# Patient Record
Sex: FEMALE | Race: WHITE | Employment: PART TIME | URBAN - METROPOLITAN AREA
[De-identification: names, ages, dates, MRNs, and addresses within clinical notes are randomized per-mention and may not be internally consistent; named-entity substitution may affect disease eponyms.]

---

## 2017-04-24 ENCOUNTER — TELEPHONE (OUTPATIENT)
Dept: FAMILY MEDICINE CLINIC | Facility: CLINIC | Age: 37
End: 2017-04-24

## 2017-04-25 ENCOUNTER — TELEPHONE (OUTPATIENT)
Dept: FAMILY MEDICINE CLINIC | Facility: CLINIC | Age: 37
End: 2017-04-25

## 2017-04-25 ENCOUNTER — OFFICE VISIT (OUTPATIENT)
Dept: FAMILY MEDICINE CLINIC | Facility: CLINIC | Age: 37
End: 2017-04-25

## 2017-04-25 ENCOUNTER — APPOINTMENT (OUTPATIENT)
Dept: LAB | Age: 37
End: 2017-04-25
Attending: FAMILY MEDICINE
Payer: COMMERCIAL

## 2017-04-25 VITALS
DIASTOLIC BLOOD PRESSURE: 74 MMHG | TEMPERATURE: 98 F | BODY MASS INDEX: 26 KG/M2 | HEART RATE: 64 BPM | RESPIRATION RATE: 12 BRPM | WEIGHT: 146 LBS | SYSTOLIC BLOOD PRESSURE: 110 MMHG

## 2017-04-25 DIAGNOSIS — M25.561 ACUTE PAIN OF RIGHT KNEE: Primary | ICD-10-CM

## 2017-04-25 DIAGNOSIS — R63.5 WEIGHT GAIN: ICD-10-CM

## 2017-04-25 DIAGNOSIS — K64.4 EXTERNAL HEMORRHOID: ICD-10-CM

## 2017-04-25 DIAGNOSIS — Z91.09 ENVIRONMENTAL ALLERGIES: ICD-10-CM

## 2017-04-25 PROCEDURE — 36415 COLL VENOUS BLD VENIPUNCTURE: CPT | Performed by: FAMILY MEDICINE

## 2017-04-25 PROCEDURE — 99214 OFFICE O/P EST MOD 30 MIN: CPT | Performed by: FAMILY MEDICINE

## 2017-04-25 PROCEDURE — 84439 ASSAY OF FREE THYROXINE: CPT | Performed by: FAMILY MEDICINE

## 2017-04-25 PROCEDURE — 84443 ASSAY THYROID STIM HORMONE: CPT | Performed by: FAMILY MEDICINE

## 2017-04-25 RX ORDER — FLUTICASONE PROPIONATE 50 MCG
2 SPRAY, SUSPENSION (ML) NASAL DAILY
Qty: 3 BOTTLE | Refills: 3 | Status: SHIPPED | OUTPATIENT
Start: 2017-04-25 | End: 2018-04-20

## 2017-04-25 NOTE — PROGRESS NOTES
Maggie Radford is a 39year old female. CHIEF COMPLAINT   Allergies, knee pain, hemorrhoid  HPI:   Allergies worse recently. Has just started fluticasone. Usually this alone will help her symptoms. Right knee pain for a couple months.   Noticed about 2 da denies chest pain on exertion  GI: denies abdominal pain and denies heartburn  NEURO: denies headaches    EXAM:   /74 mmHg  Pulse 64  Temp(Src) 98.3 °F (36.8 °C)  Resp 12  Wt 146 lb  GENERAL: well developed, well nourished,in no apparent distress  SK

## 2017-04-25 NOTE — TELEPHONE ENCOUNTER
Incoming call from pharmacist.  Prescribed medication Anucort-HC is a non covered medication.   Preferred alternative is: Proctozone HC cream, please advise, Thank you

## 2017-10-02 ENCOUNTER — OFFICE VISIT (OUTPATIENT)
Dept: FAMILY MEDICINE CLINIC | Facility: CLINIC | Age: 37
End: 2017-10-02

## 2017-10-02 ENCOUNTER — APPOINTMENT (OUTPATIENT)
Dept: LAB | Age: 37
End: 2017-10-02
Attending: FAMILY MEDICINE
Payer: COMMERCIAL

## 2017-10-02 VITALS
HEART RATE: 60 BPM | HEIGHT: 63.25 IN | DIASTOLIC BLOOD PRESSURE: 74 MMHG | SYSTOLIC BLOOD PRESSURE: 116 MMHG | BODY MASS INDEX: 25.37 KG/M2 | RESPIRATION RATE: 12 BRPM | WEIGHT: 145 LBS | TEMPERATURE: 99 F

## 2017-10-02 DIAGNOSIS — Z13.89 SCREENING FOR GENITOURINARY CONDITION: ICD-10-CM

## 2017-10-02 DIAGNOSIS — Z80.0 FAMILY HISTORY OF LIVER CANCER: ICD-10-CM

## 2017-10-02 DIAGNOSIS — Z00.00 BLOOD TESTS FOR ROUTINE GENERAL PHYSICAL EXAMINATION: ICD-10-CM

## 2017-10-02 DIAGNOSIS — Z12.4 PAP SMEAR FOR CERVICAL CANCER SCREENING: ICD-10-CM

## 2017-10-02 DIAGNOSIS — Z00.00 ROUTINE GENERAL MEDICAL EXAMINATION AT A HEALTH CARE FACILITY: Primary | ICD-10-CM

## 2017-10-02 DIAGNOSIS — Z11.51 SCREENING FOR HPV (HUMAN PAPILLOMAVIRUS): ICD-10-CM

## 2017-10-02 PROCEDURE — 88175 CYTOPATH C/V AUTO FLUID REDO: CPT | Performed by: FAMILY MEDICINE

## 2017-10-02 PROCEDURE — 36415 COLL VENOUS BLD VENIPUNCTURE: CPT | Performed by: FAMILY MEDICINE

## 2017-10-02 PROCEDURE — 87624 HPV HI-RISK TYP POOLED RSLT: CPT | Performed by: FAMILY MEDICINE

## 2017-10-02 PROCEDURE — 80061 LIPID PANEL: CPT | Performed by: FAMILY MEDICINE

## 2017-10-02 PROCEDURE — 80050 GENERAL HEALTH PANEL: CPT | Performed by: FAMILY MEDICINE

## 2017-10-02 PROCEDURE — 99395 PREV VISIT EST AGE 18-39: CPT | Performed by: FAMILY MEDICINE

## 2017-10-02 PROCEDURE — 82306 VITAMIN D 25 HYDROXY: CPT | Performed by: FAMILY MEDICINE

## 2017-10-02 PROCEDURE — 81003 URINALYSIS AUTO W/O SCOPE: CPT | Performed by: FAMILY MEDICINE

## 2017-10-02 RX ORDER — HYDROCHLOROTHIAZIDE 12.5 MG/1
12.5 CAPSULE, GELATIN COATED ORAL DAILY
Qty: 30 CAPSULE | Refills: 1 | Status: SHIPPED | OUTPATIENT
Start: 2017-10-02 | End: 2018-04-19

## 2017-10-02 NOTE — PROGRESS NOTES
CHIEF COMPLAINT   Well woman exam  HPI:   Melissa Chapin is a 44year old female who presents for a complete physical exam.    Normal pap hx.   Swelling in legs from varicose veins - treated in about 2015 - still gets some swelling - doesn't really want to we exertion  CARDIOVASCULAR: denies chest pain on exertion  GI: denies abdominal pain,denies heartburn  : denies dysuria, vaginal discharge or itching,periods regular   MUSCULOSKELETAL: denies back pain  NEURO: denies headaches  PSYCHE: denies depression or

## 2017-10-10 ENCOUNTER — LAB ENCOUNTER (OUTPATIENT)
Dept: LAB | Age: 37
End: 2017-10-10
Attending: FAMILY MEDICINE
Payer: COMMERCIAL

## 2017-10-10 DIAGNOSIS — R79.89 ABNORMAL CBC: Primary | ICD-10-CM

## 2017-10-10 DIAGNOSIS — R79.89 ABNORMAL CBC: ICD-10-CM

## 2017-10-10 PROCEDURE — 85025 COMPLETE CBC W/AUTO DIFF WBC: CPT | Performed by: FAMILY MEDICINE

## 2017-10-10 PROCEDURE — 36415 COLL VENOUS BLD VENIPUNCTURE: CPT | Performed by: FAMILY MEDICINE

## 2017-10-13 DIAGNOSIS — R79.9 ABNORMAL BLOOD FINDINGS: Primary | ICD-10-CM

## 2017-10-31 ENCOUNTER — LAB ENCOUNTER (OUTPATIENT)
Dept: LAB | Age: 37
End: 2017-10-31
Attending: FAMILY MEDICINE
Payer: COMMERCIAL

## 2017-10-31 DIAGNOSIS — R79.9 ABNORMAL BLOOD FINDINGS: ICD-10-CM

## 2017-10-31 PROCEDURE — 36415 COLL VENOUS BLD VENIPUNCTURE: CPT | Performed by: FAMILY MEDICINE

## 2017-10-31 PROCEDURE — 82728 ASSAY OF FERRITIN: CPT | Performed by: FAMILY MEDICINE

## 2017-10-31 PROCEDURE — 85025 COMPLETE CBC W/AUTO DIFF WBC: CPT | Performed by: FAMILY MEDICINE

## 2017-10-31 PROCEDURE — 83540 ASSAY OF IRON: CPT | Performed by: FAMILY MEDICINE

## 2017-11-02 ENCOUNTER — TELEPHONE (OUTPATIENT)
Dept: FAMILY MEDICINE CLINIC | Facility: CLINIC | Age: 37
End: 2017-11-02

## 2017-11-02 DIAGNOSIS — R79.89 ABNORMAL CBC: Primary | ICD-10-CM

## 2018-04-17 ENCOUNTER — TELEPHONE (OUTPATIENT)
Dept: FAMILY MEDICINE CLINIC | Facility: CLINIC | Age: 38
End: 2018-04-17

## 2018-04-17 NOTE — TELEPHONE ENCOUNTER
Pt feels OK today  Latest /80     Had episode of vision changes yesterday with BP reading of  125/85  Pt is not currently taking her BP meds    Appt made 4/19th   Advised to record BP readings  Advised to go to ED if with worsening s/s   With verbali

## 2018-04-19 ENCOUNTER — LAB ENCOUNTER (OUTPATIENT)
Dept: LAB | Age: 38
End: 2018-04-19
Attending: FAMILY MEDICINE
Payer: COMMERCIAL

## 2018-04-19 DIAGNOSIS — R79.89 ABNORMAL CBC: ICD-10-CM

## 2018-04-19 PROBLEM — R60.0 LOCALIZED EDEMA: Status: ACTIVE | Noted: 2018-04-19

## 2018-04-19 PROCEDURE — 85025 COMPLETE CBC W/AUTO DIFF WBC: CPT | Performed by: FAMILY MEDICINE

## 2018-04-19 PROCEDURE — 82728 ASSAY OF FERRITIN: CPT | Performed by: FAMILY MEDICINE

## 2018-04-19 PROCEDURE — 36415 COLL VENOUS BLD VENIPUNCTURE: CPT | Performed by: FAMILY MEDICINE

## 2018-04-19 NOTE — PROGRESS NOTES
CHIEF COMPLAINT:   Salvador Marte a 44year old female is here for BP and anxiety  HPI:    More anxiety recently. Seeing a counselor for anxiety in [place]. Has been seeing her since July when dad was in hospice.  Was on medication for anxiety in about

## 2018-04-19 NOTE — PATIENT INSTRUCTIONS
Check BP different times of the day about 4-5 times per week - write down numbers - bring to next appointment. Bring BP monitor to next appointment.

## 2018-06-14 RX ORDER — FLUOXETINE 10 MG/1
CAPSULE ORAL
Qty: 30 CAPSULE | Refills: 0 | Status: SHIPPED | OUTPATIENT
Start: 2018-06-14 | End: 2018-07-17

## 2018-06-27 ENCOUNTER — OFFICE VISIT (OUTPATIENT)
Dept: FAMILY MEDICINE CLINIC | Facility: CLINIC | Age: 38
End: 2018-06-27

## 2018-06-27 VITALS
HEART RATE: 74 BPM | HEIGHT: 63.25 IN | WEIGHT: 143 LBS | RESPIRATION RATE: 14 BRPM | TEMPERATURE: 98 F | DIASTOLIC BLOOD PRESSURE: 80 MMHG | BODY MASS INDEX: 25.02 KG/M2 | SYSTOLIC BLOOD PRESSURE: 120 MMHG

## 2018-06-27 DIAGNOSIS — R10.11 RUQ PAIN: Primary | ICD-10-CM

## 2018-06-27 PROCEDURE — 99213 OFFICE O/P EST LOW 20 MIN: CPT | Performed by: FAMILY MEDICINE

## 2018-06-27 PROCEDURE — 81003 URINALYSIS AUTO W/O SCOPE: CPT | Performed by: FAMILY MEDICINE

## 2018-06-28 NOTE — PROGRESS NOTES
[facility] Family Medicine Office Note  Chief Complaint:   Patient presents with:  Pain: right rib pain x 4 days      HPI:   This is a 43year old female coming in for abdominal pain. Pain is located at RUQ.  Pain is described as dull, aching, sh pain, chest pressure or chest discomfort. No palpitations or edema.   Denies any dyspnea on exertion or at rest  RESPIRATORY:  Denies shortness of breath, cough  GASTROINTESTINAL:  + abdominal pain, denies nausea, vomiting, constipation, diarrhea, or blood learning. Medical education done. Outcome: Patient verbalizes understanding. Patient is notified to call with any questions, complications, allergies, or worsening or changing symptoms.   Patient is to call with any side effects or complications from the

## 2018-07-02 ENCOUNTER — HOSPITAL ENCOUNTER (OUTPATIENT)
Dept: ULTRASOUND IMAGING | Age: 38
Discharge: HOME | End: 2018-07-02
Attending: FAMILY MEDICINE
Payer: COMMERCIAL

## 2018-07-02 DIAGNOSIS — R10.11 RUQ PAIN: ICD-10-CM

## 2018-07-02 PROCEDURE — 76700 US EXAM ABDOM COMPLETE: CPT | Performed by: FAMILY MEDICINE

## 2018-07-19 NOTE — TELEPHONE ENCOUNTER
Please call pt for follow up Anxiety with Carolina Fisher. She was supposed to follow up 6-8 weeks after starting the medication. Thanks.

## 2018-07-20 RX ORDER — FLUOXETINE 10 MG/1
CAPSULE ORAL
Qty: 30 CAPSULE | Refills: 0 | Status: SHIPPED | OUTPATIENT
Start: 2018-07-20 | End: 2018-09-18

## 2018-07-31 ENCOUNTER — LAB ENCOUNTER (OUTPATIENT)
Dept: LAB | Age: 38
End: 2018-07-31
Attending: FAMILY MEDICINE
Payer: COMMERCIAL

## 2018-07-31 ENCOUNTER — OFFICE VISIT (OUTPATIENT)
Dept: FAMILY MEDICINE CLINIC | Facility: CLINIC | Age: 38
End: 2018-07-31
Payer: COMMERCIAL

## 2018-07-31 VITALS
DIASTOLIC BLOOD PRESSURE: 70 MMHG | RESPIRATION RATE: 12 BRPM | HEIGHT: 63.25 IN | BODY MASS INDEX: 25.2 KG/M2 | WEIGHT: 144 LBS | SYSTOLIC BLOOD PRESSURE: 118 MMHG | HEART RATE: 68 BPM

## 2018-07-31 DIAGNOSIS — Z13.0 SCREENING FOR ENDOCRINE, METABOLIC AND IMMUNITY DISORDER: ICD-10-CM

## 2018-07-31 DIAGNOSIS — Z79.899 MEDICATION MANAGEMENT: ICD-10-CM

## 2018-07-31 DIAGNOSIS — Z13.29 SCREENING FOR ENDOCRINE, METABOLIC AND IMMUNITY DISORDER: ICD-10-CM

## 2018-07-31 DIAGNOSIS — R10.11 RUQ ABDOMINAL PAIN: ICD-10-CM

## 2018-07-31 DIAGNOSIS — Z00.00 LABORATORY EXAMINATION ORDERED AS PART OF A ROUTINE GENERAL MEDICAL EXAMINATION: Primary | ICD-10-CM

## 2018-07-31 DIAGNOSIS — R60.0 LEG EDEMA: ICD-10-CM

## 2018-07-31 DIAGNOSIS — R93.89 ABNORMAL FINDINGS ON IMAGING TEST: ICD-10-CM

## 2018-07-31 DIAGNOSIS — Z13.228 SCREENING FOR ENDOCRINE, METABOLIC AND IMMUNITY DISORDER: ICD-10-CM

## 2018-07-31 DIAGNOSIS — F41.9 ANXIETY: ICD-10-CM

## 2018-07-31 DIAGNOSIS — N20.0 RIGHT KIDNEY STONE: ICD-10-CM

## 2018-07-31 PROBLEM — K76.0 FATTY LIVER: Status: ACTIVE | Noted: 2018-07-31

## 2018-07-31 LAB
ALBUMIN SERPL-MCNC: 3.7 G/DL (ref 3.5–4.8)
ALBUMIN/GLOB SERPL: 1 {RATIO} (ref 1–2)
ALP LIVER SERPL-CCNC: 63 U/L (ref 37–98)
ALT SERPL-CCNC: 19 U/L (ref 14–54)
ANION GAP SERPL CALC-SCNC: 6 MMOL/L (ref 0–18)
AST SERPL-CCNC: 24 U/L (ref 15–41)
BILIRUB SERPL-MCNC: 0.3 MG/DL (ref 0.1–2)
BUN BLD-MCNC: 16 MG/DL (ref 8–20)
BUN/CREAT SERPL: 20 (ref 10–20)
CALCIUM BLD-MCNC: 8.8 MG/DL (ref 8.3–10.3)
CHLORIDE SERPL-SCNC: 106 MMOL/L (ref 101–111)
CO2 SERPL-SCNC: 26 MMOL/L (ref 22–32)
CREAT BLD-MCNC: 0.8 MG/DL (ref 0.55–1.02)
GLOBULIN PLAS-MCNC: 3.7 G/DL (ref 2.5–3.7)
GLUCOSE BLD-MCNC: 84 MG/DL (ref 70–99)
HBV SURFACE AB SER QL: NONREACTIVE
HBV SURFACE AB SERPL IA-ACNC: <3.1 MIU/ML
HEPATITIS A VIRUS AB, TOTAL: NONREACTIVE
M PROTEIN MFR SERPL ELPH: 7.4 G/DL (ref 6.1–8.3)
OSMOLALITY SERPL CALC.SUM OF ELEC: 286 MOSM/KG (ref 275–295)
POTASSIUM SERPL-SCNC: 4.2 MMOL/L (ref 3.6–5.1)
RUBV IGG SER QL: POSITIVE
RUBV IGG SER-ACNC: 317.6 IU/ML (ref 10–?)
SODIUM SERPL-SCNC: 138 MMOL/L (ref 136–144)

## 2018-07-31 PROCEDURE — 86787 VARICELLA-ZOSTER ANTIBODY: CPT | Performed by: FAMILY MEDICINE

## 2018-07-31 PROCEDURE — 86706 HEP B SURFACE ANTIBODY: CPT | Performed by: FAMILY MEDICINE

## 2018-07-31 PROCEDURE — 80053 COMPREHEN METABOLIC PANEL: CPT | Performed by: FAMILY MEDICINE

## 2018-07-31 PROCEDURE — 86762 RUBELLA ANTIBODY: CPT | Performed by: FAMILY MEDICINE

## 2018-07-31 PROCEDURE — 99214 OFFICE O/P EST MOD 30 MIN: CPT | Performed by: FAMILY MEDICINE

## 2018-07-31 PROCEDURE — 36415 COLL VENOUS BLD VENIPUNCTURE: CPT | Performed by: FAMILY MEDICINE

## 2018-07-31 PROCEDURE — 86708 HEPATITIS A ANTIBODY: CPT | Performed by: FAMILY MEDICINE

## 2018-07-31 PROCEDURE — 86765 RUBEOLA ANTIBODY: CPT | Performed by: FAMILY MEDICINE

## 2018-07-31 PROCEDURE — 86735 MUMPS ANTIBODY: CPT | Performed by: FAMILY MEDICINE

## 2018-07-31 RX ORDER — FLUOXETINE 20 MG/1
20 TABLET, FILM COATED ORAL DAILY
Qty: 90 TABLET | Refills: 0 | Status: SHIPPED | OUTPATIENT
Start: 2018-07-31 | End: 2018-09-18

## 2018-08-01 PROBLEM — Z86.59 HISTORY OF ANOREXIA NERVOSA: Status: ACTIVE | Noted: 2018-08-01

## 2018-08-01 PROBLEM — Z86.59: Status: ACTIVE | Noted: 2018-08-01

## 2018-08-01 NOTE — PROGRESS NOTES
Abril Ayers is a 43year old female. HPI:   PT. Is here for a med check. Patient states she was started on fluoxetine in April 2018 by Marcus Weiss due to anxiety.     When reviewing the medical record, it was noted that she has a history of anorex facility-administered medications for this visit.     No Known Allergies   Past Medical History:   Diagnosis Date   • Anorexia nervosa    • Depressive disorder, not elsewhere classified    • Personal history of neurosis    • Unspecified hemorrhoids without murmur  GI: soft, good BS's; nontender  EXTREMITIES: no cyanosis, clubbing; tr. left edema    ASSESSMENT AND PLAN:   Laboratory examination ordered as part of a routine general medical examination  (primary encounter diagnosis)  Medication management  Scre

## 2018-08-03 LAB
MEV IGG SER IA-ACNC: POSITIVE
MUV IGG SER IA-ACNC: POSITIVE
VZV IGG SER IA-ACNC: POSITIVE

## 2018-08-24 RX ORDER — FLUOXETINE 10 MG/1
CAPSULE ORAL
Qty: 30 CAPSULE | Refills: 0 | OUTPATIENT
Start: 2018-08-24

## 2018-08-24 NOTE — TELEPHONE ENCOUNTER
Not protocol medication. LOV :7/31/18 medication follow up anxiety discussed  Last labs done :7/31/18  Next appointment :9/20/18  Please see pending medication. Refill if appropriate.    Last refill: according to last office note patient is taking fluoxet

## 2018-08-24 NOTE — TELEPHONE ENCOUNTER
Please call pt and remind her she is to be seen around 09/11/2018 for follow up prior to refilling her Fluoxetine. Thanks. Not if with Dr. Dianne Soares or Shirley Mars.   Usually sees Shirley Mars, but saw Dr. Dianne Soares for physical.

## 2018-09-18 ENCOUNTER — TELEPHONE (OUTPATIENT)
Dept: FAMILY MEDICINE CLINIC | Facility: CLINIC | Age: 38
End: 2018-09-18

## 2018-09-18 NOTE — PROGRESS NOTES
Riley Dunbar is a 43year old female. CHIEF COMPLAINT   Follow up on anxiety  HPI:   Here to follow-up on Prozac. Was started on Prozac in April for increasing anxiety. She had previously been on Prozac back in 2004. No counseling currently.  No depressi 24.60 kg/m²   GENERAL: well developed, well nourished,in no apparent distress  SKIN: no rashes,no suspicious lesions  NECK: supple,no adenopathy  LUNGS: clear to auscultation  CARDIO: RRR without murmur  EXTREMITIES: no cyanosis, clubbing or edema.   Right

## 2018-09-19 NOTE — TELEPHONE ENCOUNTER
I saw patient today for medication follow up. She has an appointment scheduled on Thursday. I would assume we can cancel this since it appears that she rescheduled this appointment for today.   Please verify with  who made the appointment today a

## 2019-04-05 ENCOUNTER — WALK IN (OUTPATIENT)
Dept: URGENT CARE | Age: 39
End: 2019-04-05

## 2019-04-05 VITALS
HEIGHT: 63 IN | BODY MASS INDEX: 26.58 KG/M2 | TEMPERATURE: 98.5 F | RESPIRATION RATE: 16 BRPM | SYSTOLIC BLOOD PRESSURE: 118 MMHG | DIASTOLIC BLOOD PRESSURE: 76 MMHG | WEIGHT: 150 LBS | HEART RATE: 84 BPM

## 2019-04-05 DIAGNOSIS — H61.22 IMPACTED CERUMEN OF LEFT EAR: Primary | ICD-10-CM

## 2019-04-05 PROCEDURE — X1094 NO CHARGE VISIT: HCPCS | Performed by: OBSTETRICS & GYNECOLOGY

## 2019-04-05 PROCEDURE — 69209 REMOVE IMPACTED EAR WAX UNI: CPT | Performed by: OBSTETRICS & GYNECOLOGY

## 2019-04-05 PROCEDURE — 99203 OFFICE O/P NEW LOW 30 MIN: CPT | Performed by: OBSTETRICS & GYNECOLOGY

## 2019-04-05 RX ORDER — NORGESTIMATE AND ETHINYL ESTRADIOL 7DAYSX3 LO
1 KIT ORAL DAILY
COMMUNITY

## 2019-04-05 ASSESSMENT — ENCOUNTER SYMPTOMS
DIAPHORESIS: 0
VOMITING: 0
DIARRHEA: 0
COUGH: 0
FEVER: 0
SINUS PRESSURE: 1
NAUSEA: 0
HEADACHES: 1
SINUS PAIN: 0
CHILLS: 0

## 2020-01-28 ENCOUNTER — OFFICE VISIT (OUTPATIENT)
Dept: FAMILY MEDICINE CLINIC | Facility: CLINIC | Age: 40
End: 2020-01-28
Payer: COMMERCIAL

## 2020-01-28 ENCOUNTER — TELEPHONE (OUTPATIENT)
Dept: FAMILY MEDICINE CLINIC | Facility: CLINIC | Age: 40
End: 2020-01-28

## 2020-01-28 VITALS
BODY MASS INDEX: 28 KG/M2 | DIASTOLIC BLOOD PRESSURE: 80 MMHG | WEIGHT: 158 LBS | TEMPERATURE: 99 F | HEART RATE: 77 BPM | HEIGHT: 63 IN | OXYGEN SATURATION: 99 % | SYSTOLIC BLOOD PRESSURE: 120 MMHG

## 2020-01-28 DIAGNOSIS — R10.11 RUQ PAIN: Primary | ICD-10-CM

## 2020-01-28 DIAGNOSIS — K76.0 FATTY LIVER: ICD-10-CM

## 2020-01-28 DIAGNOSIS — F41.9 ANXIETY: ICD-10-CM

## 2020-01-28 DIAGNOSIS — Z00.00 BLOOD TESTS FOR ROUTINE GENERAL PHYSICAL EXAMINATION: ICD-10-CM

## 2020-01-28 DIAGNOSIS — Z80.0 FAMILY HISTORY OF LIVER CANCER: ICD-10-CM

## 2020-01-28 DIAGNOSIS — D50.9 IRON DEFICIENCY ANEMIA, UNSPECIFIED IRON DEFICIENCY ANEMIA TYPE: ICD-10-CM

## 2020-01-28 PROCEDURE — 99214 OFFICE O/P EST MOD 30 MIN: CPT | Performed by: FAMILY MEDICINE

## 2020-01-28 RX ORDER — ESCITALOPRAM OXALATE 5 MG/1
5 TABLET ORAL DAILY
Qty: 30 TABLET | Refills: 1 | Status: SHIPPED | OUTPATIENT
Start: 2020-01-28 | End: 2020-08-25

## 2020-01-28 NOTE — TELEPHONE ENCOUNTER
1. What are your symptoms? Pain on right side of abdomen        2. How long have you been having these symptoms? approx a month - now more constant        3. Have you done anything already to treat your symptoms?      Has taken Ibuprofen but has not h

## 2020-01-28 NOTE — TELEPHONE ENCOUNTER
Onset-one month on and off  RUQ radiates to back   \"Burning like pain\" 3/10 right now  Mild to moderate  No N/V   No fever  No urinary sx  No diarrhea/ constipation  Unchanged with activity  Appetite ok     Hx of abdl pain like this last year, went away

## 2020-01-28 NOTE — PROGRESS NOTES
Salvador Marte is a 35year old female. CHIEF COMPLAINT   RUQ pain and follow up on anxiety  HPI:   Stopped prozac in July - took paxil and zoloft previously. Didn't seem to help much. Would like to try a different medication other than prozac.   Was [facility] Anorexia nervosa    • Depressive disorder, not elsewhere classified    • Personal history of neurosis    • Unspecified hemorrhoids without mention of complication       Social History:  Social History    Tobacco Use      Smoking status: Former Smoker plan.  The patient is asked to return in 4-6 weeks - sooner if any worsening.

## 2020-02-11 ENCOUNTER — PATIENT MESSAGE (OUTPATIENT)
Dept: FAMILY MEDICINE CLINIC | Facility: CLINIC | Age: 40
End: 2020-02-11

## 2020-02-11 ENCOUNTER — HOSPITAL ENCOUNTER (OUTPATIENT)
Dept: ULTRASOUND IMAGING | Age: 40
Discharge: HOME | End: 2020-02-11
Attending: FAMILY MEDICINE
Payer: COMMERCIAL

## 2020-02-11 DIAGNOSIS — R10.11 RUQ PAIN: ICD-10-CM

## 2020-02-11 PROCEDURE — 76700 US EXAM ABDOM COMPLETE: CPT | Performed by: FAMILY MEDICINE

## 2020-02-22 ENCOUNTER — LAB ENCOUNTER (OUTPATIENT)
Dept: LAB | Age: 40
End: 2020-02-22
Attending: FAMILY MEDICINE
Payer: COMMERCIAL

## 2020-02-22 DIAGNOSIS — Z00.00 BLOOD TESTS FOR ROUTINE GENERAL PHYSICAL EXAMINATION: ICD-10-CM

## 2020-02-22 DIAGNOSIS — D50.9 IRON DEFICIENCY ANEMIA, UNSPECIFIED IRON DEFICIENCY ANEMIA TYPE: ICD-10-CM

## 2020-02-22 DIAGNOSIS — Z80.0 FAMILY HISTORY OF LIVER CANCER: ICD-10-CM

## 2020-02-22 LAB
ALBUMIN SERPL-MCNC: 3.6 G/DL (ref 3.4–5)
ALBUMIN/GLOB SERPL: 0.9 {RATIO} (ref 1–2)
ALP LIVER SERPL-CCNC: 61 U/L (ref 37–98)
ALT SERPL-CCNC: 28 U/L (ref 13–56)
ANION GAP SERPL CALC-SCNC: 2 MMOL/L (ref 0–18)
AST SERPL-CCNC: 23 U/L (ref 15–37)
BASOPHILS # BLD AUTO: 0.01 X10(3) UL (ref 0–0.2)
BASOPHILS NFR BLD AUTO: 0.3 %
BILIRUB SERPL-MCNC: 0.3 MG/DL (ref 0.1–2)
BUN BLD-MCNC: 10 MG/DL (ref 7–18)
BUN/CREAT SERPL: 14.7 (ref 10–20)
CALCIUM BLD-MCNC: 8.7 MG/DL (ref 8.5–10.1)
CHLORIDE SERPL-SCNC: 107 MMOL/L (ref 98–112)
CHOLEST SMN-MCNC: 196 MG/DL (ref ?–200)
CO2 SERPL-SCNC: 27 MMOL/L (ref 21–32)
CREAT BLD-MCNC: 0.68 MG/DL (ref 0.55–1.02)
DEPRECATED HBV CORE AB SER IA-ACNC: 7.2 NG/ML (ref 12–240)
DEPRECATED RDW RBC AUTO: 47 FL (ref 35.1–46.3)
EOSINOPHIL # BLD AUTO: 0.03 X10(3) UL (ref 0–0.7)
EOSINOPHIL NFR BLD AUTO: 0.9 %
ERYTHROCYTE [DISTWIDTH] IN BLOOD BY AUTOMATED COUNT: 13.9 % (ref 11–15)
GLOBULIN PLAS-MCNC: 3.8 G/DL (ref 2.8–4.4)
GLUCOSE BLD-MCNC: 97 MG/DL (ref 70–99)
HCT VFR BLD AUTO: 34.8 % (ref 35–48)
HDLC SERPL-MCNC: 106 MG/DL (ref 40–59)
HGB BLD-MCNC: 11.2 G/DL (ref 12–16)
IMM GRANULOCYTES # BLD AUTO: 0.01 X10(3) UL (ref 0–1)
IMM GRANULOCYTES NFR BLD: 0.3 %
LDLC SERPL CALC-MCNC: 75 MG/DL (ref ?–100)
LYMPHOCYTES # BLD AUTO: 1.24 X10(3) UL (ref 1–4)
LYMPHOCYTES NFR BLD AUTO: 36.2 %
M PROTEIN MFR SERPL ELPH: 7.4 G/DL (ref 6.4–8.2)
MCH RBC QN AUTO: 29.6 PG (ref 26–34)
MCHC RBC AUTO-ENTMCNC: 32.2 G/DL (ref 31–37)
MCV RBC AUTO: 92.1 FL (ref 80–100)
MONOCYTES # BLD AUTO: 0.35 X10(3) UL (ref 0.1–1)
MONOCYTES NFR BLD AUTO: 10.2 %
NEUTROPHILS # BLD AUTO: 1.79 X10 (3) UL (ref 1.5–7.7)
NEUTROPHILS # BLD AUTO: 1.79 X10(3) UL (ref 1.5–7.7)
NEUTROPHILS NFR BLD AUTO: 52.1 %
NONHDLC SERPL-MCNC: 90 MG/DL (ref ?–130)
OSMOLALITY SERPL CALC.SUM OF ELEC: 281 MOSM/KG (ref 275–295)
PATIENT FASTING Y/N/NP: YES
PATIENT FASTING Y/N/NP: YES
PLATELET # BLD AUTO: 251 10(3)UL (ref 150–450)
POTASSIUM SERPL-SCNC: 4.4 MMOL/L (ref 3.5–5.1)
RBC # BLD AUTO: 3.78 X10(6)UL (ref 3.8–5.3)
SODIUM SERPL-SCNC: 136 MMOL/L (ref 136–145)
TRIGL SERPL-MCNC: 75 MG/DL (ref 30–149)
TSI SER-ACNC: 1.33 MIU/ML (ref 0.36–3.74)
VLDLC SERPL CALC-MCNC: 15 MG/DL (ref 0–30)
WBC # BLD AUTO: 3.4 X10(3) UL (ref 4–11)

## 2020-02-22 PROCEDURE — 36415 COLL VENOUS BLD VENIPUNCTURE: CPT | Performed by: FAMILY MEDICINE

## 2020-02-22 PROCEDURE — 80061 LIPID PANEL: CPT | Performed by: FAMILY MEDICINE

## 2020-02-22 PROCEDURE — 80050 GENERAL HEALTH PANEL: CPT | Performed by: FAMILY MEDICINE

## 2020-02-22 PROCEDURE — 82728 ASSAY OF FERRITIN: CPT | Performed by: FAMILY MEDICINE

## 2020-02-24 ENCOUNTER — OFFICE VISIT (OUTPATIENT)
Dept: SURGERY | Facility: CLINIC | Age: 40
End: 2020-02-24
Payer: COMMERCIAL

## 2020-02-24 ENCOUNTER — PATIENT MESSAGE (OUTPATIENT)
Dept: FAMILY MEDICINE CLINIC | Facility: CLINIC | Age: 40
End: 2020-02-24

## 2020-02-24 ENCOUNTER — OFFICE VISIT (OUTPATIENT)
Dept: FAMILY MEDICINE CLINIC | Facility: CLINIC | Age: 40
End: 2020-02-24
Payer: COMMERCIAL

## 2020-02-24 VITALS
HEIGHT: 63 IN | WEIGHT: 156.19 LBS | HEART RATE: 80 BPM | RESPIRATION RATE: 18 BRPM | SYSTOLIC BLOOD PRESSURE: 137 MMHG | BODY MASS INDEX: 27.68 KG/M2 | OXYGEN SATURATION: 100 % | DIASTOLIC BLOOD PRESSURE: 90 MMHG

## 2020-02-24 VITALS
BODY MASS INDEX: 26.96 KG/M2 | SYSTOLIC BLOOD PRESSURE: 124 MMHG | DIASTOLIC BLOOD PRESSURE: 70 MMHG | RESPIRATION RATE: 12 BRPM | HEIGHT: 63.75 IN | WEIGHT: 156 LBS | HEART RATE: 72 BPM | TEMPERATURE: 100 F

## 2020-02-24 DIAGNOSIS — K76.9 LIVER LESION: Primary | ICD-10-CM

## 2020-02-24 DIAGNOSIS — Z00.00 ROUTINE GENERAL MEDICAL EXAMINATION AT A HEALTH CARE FACILITY: Primary | ICD-10-CM

## 2020-02-24 DIAGNOSIS — Z12.31 ENCOUNTER FOR SCREENING MAMMOGRAM FOR MALIGNANT NEOPLASM OF BREAST: ICD-10-CM

## 2020-02-24 DIAGNOSIS — D64.9 ANEMIA, UNSPECIFIED TYPE: ICD-10-CM

## 2020-02-24 PROCEDURE — 99396 PREV VISIT EST AGE 40-64: CPT | Performed by: FAMILY MEDICINE

## 2020-02-24 RX ORDER — ACETAMINOPHEN 160 MG
2000 TABLET,DISINTEGRATING ORAL DAILY
COMMUNITY

## 2020-02-24 RX ORDER — ESCITALOPRAM OXALATE 5 MG/1
TABLET ORAL
COMMUNITY
Start: 2020-01-28 | End: 2020-02-24 | Stop reason: ALTCHOICE

## 2020-02-24 NOTE — PROGRESS NOTES
CHIEF COMPLAINT       Annual Physical Exam  HPI:   Jessica Mcmanus is a 35year old female who presents for a complete physical exam.   Saw Dr. Jaqueline Cota today - repeat CT 6 months planned. Sees [facility] for paps.     Moderate menstrual flow - was on an OCP for [facility] Family History   Problem Relation Age of Onset   • Thyroid Disorder Mother    • Cancer Father 76        Bladder cancer, liver cancer   • Heart Disorder Father    • Cancer Paternal Grandmother 78        leukemia   • Cancer Maternal Aunt 68        colon ca EXTREMITIES: no cyanosis, clubbing or edema  NEURO: Oriented times three,cranial nerves are intact,motor and sensory are grossly intact    ASSESSMENT AND PLAN:   Thanh Atkinson is a 35year old female who presents for a complete physical exam.   Pt' car mortensen

## 2020-02-24 NOTE — PATIENT INSTRUCTIONS
Take slow FE once daily and recheck CBC and ferritin in 8 weeks. Check with mom to make sure she hasn't had any potentially precancerous polyps.   If she has, then call us for referral to a gastroenterologist.

## 2020-02-24 NOTE — PROGRESS NOTES
[facility]  [address], [address]  [address]  [phone] ([phone]) Social History    Tobacco Use      Smoking status: Former Smoker      Smokeless tobacco: Never Used      Tobacco comment: casual in college    Alcohol use:  Yes      Alcohol/week: 0.8 standard drinks      Types: 1 Standard drinks or equivalent per week (she reports claustrophobia for MRI)    - Future elastography after lesion is addressed given the underlying fatty liver  - HAV/HBV susceptible; discussed benefit of vaccination  - Follow up 6 months       Alvarez Del Valle MD  Director of Hepatology  Medic

## 2021-07-27 ENCOUNTER — OFFICE VISIT (OUTPATIENT)
Dept: FAMILY MEDICINE CLINIC | Facility: CLINIC | Age: 41
End: 2021-07-27
Payer: COMMERCIAL

## 2021-07-27 VITALS — HEART RATE: 72 BPM | DIASTOLIC BLOOD PRESSURE: 70 MMHG | SYSTOLIC BLOOD PRESSURE: 104 MMHG | RESPIRATION RATE: 16 BRPM

## 2021-07-27 DIAGNOSIS — Z13.89 SCREENING FOR GENITOURINARY CONDITION: ICD-10-CM

## 2021-07-27 DIAGNOSIS — Z12.31 ENCOUNTER FOR SCREENING MAMMOGRAM FOR MALIGNANT NEOPLASM OF BREAST: ICD-10-CM

## 2021-07-27 DIAGNOSIS — Z00.00 ROUTINE GENERAL MEDICAL EXAMINATION AT A HEALTH CARE FACILITY: Primary | ICD-10-CM

## 2021-07-27 DIAGNOSIS — Z11.51 SCREENING FOR HPV (HUMAN PAPILLOMAVIRUS): ICD-10-CM

## 2021-07-27 DIAGNOSIS — D64.9 ANEMIA, UNSPECIFIED TYPE: ICD-10-CM

## 2021-07-27 DIAGNOSIS — Z00.00 BLOOD TESTS FOR ROUTINE GENERAL PHYSICAL EXAMINATION: ICD-10-CM

## 2021-07-27 DIAGNOSIS — Z12.4 PAP SMEAR FOR CERVICAL CANCER SCREENING: ICD-10-CM

## 2021-07-27 PROCEDURE — 3074F SYST BP LT 130 MM HG: CPT | Performed by: FAMILY MEDICINE

## 2021-07-27 PROCEDURE — 99396 PREV VISIT EST AGE 40-64: CPT | Performed by: FAMILY MEDICINE

## 2021-07-27 PROCEDURE — 87624 HPV HI-RISK TYP POOLED RSLT: CPT | Performed by: FAMILY MEDICINE

## 2021-07-27 PROCEDURE — 3078F DIAST BP <80 MM HG: CPT | Performed by: FAMILY MEDICINE

## 2021-07-27 NOTE — PROGRESS NOTES
CHIEF COMPLAINT       Annual Physical Exam  HPI:   Myla Perry is a 42year old female who presents for a complete physical exam.   Pap last year - normal per patient - done at gyne. Dr. Simona Barrios. Normal Pap hx. Would like Pap this year.    Condoms for bir Problem Relation Age of Onset   • Thyroid Disorder Mother    • Cancer Father 76        Bladder cancer, liver cancer   • Heart Disorder Father    • Cancer Paternal Grandmother 78        leukemia   • Cancer Maternal Aunt 68        colon cancer   • Other (O EXTREMITIES: no cyanosis, clubbing or edema  NEURO: Oriented times three,cranial nerves are intact,motor and sensory are grossly intact    ASSESSMENT AND PLAN:   Salvador Marte is a 42year old female who presents for a complete physical exam.   Pt' s festus

## 2021-08-02 LAB — HPV I/H RISK 1 DNA SPEC QL NAA+PROBE: NEGATIVE

## 2021-12-06 ENCOUNTER — TELEPHONE (OUTPATIENT)
Dept: FAMILY MEDICINE CLINIC | Facility: CLINIC | Age: 41
End: 2021-12-06

## 2021-12-06 ENCOUNTER — HOSPITAL ENCOUNTER (OUTPATIENT)
Age: 41
Discharge: HOME | End: 2021-12-06
Payer: COMMERCIAL

## 2021-12-06 ENCOUNTER — APPOINTMENT (OUTPATIENT)
Dept: GENERAL RADIOLOGY | Age: 41
End: 2021-12-06
Attending: NURSE PRACTITIONER
Payer: COMMERCIAL

## 2021-12-06 VITALS
OXYGEN SATURATION: 100 % | WEIGHT: 187 LBS | HEART RATE: 78 BPM | TEMPERATURE: 98 F | HEIGHT: 63 IN | DIASTOLIC BLOOD PRESSURE: 88 MMHG | RESPIRATION RATE: 16 BRPM | BODY MASS INDEX: 33.13 KG/M2 | SYSTOLIC BLOOD PRESSURE: 144 MMHG

## 2021-12-06 DIAGNOSIS — R09.1 PLEURISY: Primary | ICD-10-CM

## 2021-12-06 DIAGNOSIS — R59.0 LYMPHADENOPATHY, AXILLARY: ICD-10-CM

## 2021-12-06 PROCEDURE — 36415 COLL VENOUS BLD VENIPUNCTURE: CPT

## 2021-12-06 PROCEDURE — 85378 FIBRIN DEGRADE SEMIQUANT: CPT | Performed by: NURSE PRACTITIONER

## 2021-12-06 PROCEDURE — 71046 X-RAY EXAM CHEST 2 VIEWS: CPT | Performed by: NURSE PRACTITIONER

## 2021-12-06 PROCEDURE — 99215 OFFICE O/P EST HI 40 MIN: CPT

## 2021-12-06 PROCEDURE — 99204 OFFICE O/P NEW MOD 45 MIN: CPT

## 2021-12-06 RX ORDER — HYDROCODONE BITARTRATE AND ACETAMINOPHEN 5; 325 MG/1; MG/1
1-2 TABLET ORAL EVERY 6 HOURS PRN
Qty: 10 TABLET | Refills: 0 | Status: SHIPPED | OUTPATIENT
Start: 2021-12-06 | End: 2021-12-13

## 2021-12-06 NOTE — ED INITIAL ASSESSMENT (HPI)
Pt states she is having pain under her left armpit that radiates to her back, it has been going on for fifteen days.   It got worse today, and could not get a deep breath

## 2021-12-06 NOTE — TELEPHONE ENCOUNTER
1. What are your symptoms? Pain from her armpit to the middle of her back hard to take a breath 7/10 pain     2. How long have you been having these symptoms? 2 weeks    3. Have you done anything already to treat your symptoms?          ADDITIONAL INF

## 2021-12-06 NOTE — TELEPHONE ENCOUNTER
Live call to triage-pt requesting appt with fiorella BLAKELY. Wonders if she has a rib out of place. sts was using left arm repeatedly to get up from uneven floor around time symptoms started.    reports approx 2 wk hx of sharp constant pain from mid-left great

## 2021-12-07 NOTE — ED PROVIDER NOTES
Patient Seen in: [facility]      History   Patient presents with:  Arm Pain    Stated Complaint: under the arm pain    Subjective:   28-year-old female presents to immediate care for 2 weeks of armpit pain that occasionally radiates to he appearance. She is not ill-appearing. HENT:      Head: Normocephalic. Cardiovascular:      Rate and Rhythm: Normal rate. Pulmonary:      Effort: Pulmonary effort is normal.   Chest:   Breasts:      Right: Axillary adenopathy present.        Musculoske

## 2022-03-10 NOTE — TELEPHONE ENCOUNTER
Order faxed to [facility] @ [phone], per her request.
Order given at office visit today. Do they still need a verbal order?
Pls call Jeimy w/[facility] [employer] with a verbal order for pt's Physical Therapy. Per [facility]im[facility], pt was evaluated on Friday 4/21 for rt knee pain; per [facility], pt's plan of care with transcription will be ready this Friday, 4/28. [facility] @ [phone].   Also,
no

## 2022-04-01 ENCOUNTER — APPOINTMENT (OUTPATIENT)
Dept: CT IMAGING | Age: 42
End: 2022-04-01
Attending: NURSE PRACTITIONER
Payer: COMMERCIAL

## 2022-04-01 ENCOUNTER — HOSPITAL ENCOUNTER (OUTPATIENT)
Age: 42
Discharge: HOME | End: 2022-04-01
Payer: COMMERCIAL

## 2022-04-01 VITALS
OXYGEN SATURATION: 100 % | DIASTOLIC BLOOD PRESSURE: 70 MMHG | WEIGHT: 185.19 LBS | TEMPERATURE: 99 F | BODY MASS INDEX: 33 KG/M2 | SYSTOLIC BLOOD PRESSURE: 130 MMHG | RESPIRATION RATE: 17 BRPM | HEART RATE: 80 BPM

## 2022-04-01 DIAGNOSIS — K57.92 ACUTE DIVERTICULITIS: Primary | ICD-10-CM

## 2022-04-01 LAB
#MXD IC: 0.5 X10ˆ3/UL (ref 0.1–1)
B-HCG UR QL: NEGATIVE
BUN BLD-MCNC: 9 MG/DL (ref 7–18)
CHLORIDE BLD-SCNC: 102 MMOL/L (ref 98–112)
CO2 BLD-SCNC: 25 MMOL/L (ref 21–32)
CREAT BLD-MCNC: 0.6 MG/DL
GLUCOSE BLD-MCNC: 95 MG/DL (ref 70–99)
HCT VFR BLD AUTO: 37.5 %
HCT VFR BLD CALC: 37 %
HGB BLD-MCNC: 12.1 G/DL
ISTAT IONIZED CALCIUM FOR CHEM 8: 1.24 MMOL/L (ref 1.12–1.32)
LYMPHOCYTES # BLD AUTO: 1.1 X10ˆ3/UL (ref 1–4)
LYMPHOCYTES NFR BLD AUTO: 13.3 %
MCH RBC QN AUTO: 29.9 PG (ref 26–34)
MCHC RBC AUTO-ENTMCNC: 32.3 G/DL (ref 31–37)
MCV RBC AUTO: 92.6 FL (ref 80–100)
MIXED CELL %: 6.5 %
NEUTROPHILS # BLD AUTO: 6.6 X10ˆ3/UL (ref 1.5–7.7)
NEUTROPHILS NFR BLD AUTO: 80.2 %
PLATELET # BLD AUTO: 342 X10ˆ3/UL (ref 150–450)
POCT BILIRUBIN URINE: NEGATIVE
POCT BLOOD URINE: NEGATIVE
POCT GLUCOSE URINE: NEGATIVE MG/DL
POCT KETONE URINE: NEGATIVE MG/DL
POCT LEUKOCYTE ESTERASE URINE: NEGATIVE
POCT NITRITE URINE: NEGATIVE
POCT PH URINE: 8 (ref 5–8)
POCT PROTEIN URINE: NEGATIVE MG/DL
POCT SPECIFIC GRAVITY URINE: 1.02
POCT UROBILINOGEN URINE: 0.2 MG/DL
POTASSIUM BLD-SCNC: 3.5 MMOL/L (ref 3.6–5.1)
RBC # BLD AUTO: 4.05 X10ˆ6/UL
SODIUM BLD-SCNC: 138 MMOL/L (ref 136–145)
WBC # BLD AUTO: 8.2 X10ˆ3/UL (ref 4–11)

## 2022-04-01 PROCEDURE — 99214 OFFICE O/P EST MOD 30 MIN: CPT

## 2022-04-01 PROCEDURE — 80047 BASIC METABLC PNL IONIZED CA: CPT

## 2022-04-01 PROCEDURE — 85025 COMPLETE CBC W/AUTO DIFF WBC: CPT | Performed by: NURSE PRACTITIONER

## 2022-04-01 PROCEDURE — 81025 URINE PREGNANCY TEST: CPT

## 2022-04-01 PROCEDURE — 96361 HYDRATE IV INFUSION ADD-ON: CPT

## 2022-04-01 PROCEDURE — 96374 THER/PROPH/DIAG INJ IV PUSH: CPT

## 2022-04-01 PROCEDURE — 74177 CT ABD & PELVIS W/CONTRAST: CPT | Performed by: NURSE PRACTITIONER

## 2022-04-01 PROCEDURE — 99215 OFFICE O/P EST HI 40 MIN: CPT

## 2022-04-01 PROCEDURE — 81002 URINALYSIS NONAUTO W/O SCOPE: CPT | Performed by: NURSE PRACTITIONER

## 2022-04-01 RX ORDER — IOHEXOL 350 MG/ML
100 INJECTION, SOLUTION INTRAVENOUS
Status: COMPLETED | OUTPATIENT
Start: 2022-04-01 | End: 2022-04-01

## 2022-04-01 RX ORDER — METRONIDAZOLE 500 MG/1
500 TABLET ORAL 3 TIMES DAILY
Qty: 30 TABLET | Refills: 0 | Status: SHIPPED | OUTPATIENT
Start: 2022-04-01 | End: 2022-04-07

## 2022-04-01 RX ORDER — CIPROFLOXACIN 500 MG/1
500 TABLET, FILM COATED ORAL 2 TIMES DAILY
Qty: 20 TABLET | Refills: 0 | Status: SHIPPED | OUTPATIENT
Start: 2022-04-01 | End: 2022-04-07

## 2022-04-01 RX ORDER — SODIUM CHLORIDE 9 MG/ML
1000 INJECTION, SOLUTION INTRAVENOUS ONCE
Status: COMPLETED | OUTPATIENT
Start: 2022-04-01 | End: 2022-04-01

## 2022-04-01 RX ORDER — DICYCLOMINE HCL 20 MG
20 TABLET ORAL 4 TIMES DAILY PRN
Qty: 30 TABLET | Refills: 0 | Status: SHIPPED | OUTPATIENT
Start: 2022-04-01 | End: 2022-05-01

## 2022-04-01 RX ORDER — ONDANSETRON 2 MG/ML
4 INJECTION INTRAMUSCULAR; INTRAVENOUS ONCE
Status: COMPLETED | OUTPATIENT
Start: 2022-04-01 | End: 2022-04-01

## 2022-04-01 RX ORDER — ONDANSETRON 4 MG/1
4 TABLET, ORALLY DISINTEGRATING ORAL EVERY 4 HOURS PRN
Qty: 10 TABLET | Refills: 0 | Status: SHIPPED | OUTPATIENT
Start: 2022-04-01 | End: 2022-04-08

## 2022-04-01 NOTE — ED INITIAL ASSESSMENT (HPI)
Pt c/o low midabdominal pain, cramping intermittant pain starting mildly last night, severe today rating 10/10, nausea today, diarrhea x2 yesterday, pt denies urinary s/s

## 2022-04-04 ENCOUNTER — TELEPHONE (OUTPATIENT)
Dept: FAMILY MEDICINE CLINIC | Facility: CLINIC | Age: 42
End: 2022-04-04

## 2022-04-04 NOTE — TELEPHONE ENCOUNTER
Pt was in UC on 4/1.--acute diverticulitis. Started on Metronidazole and ciprofloxacin   Was advised to f/u with GI and PCP. Per AVS, was recommended to see Malorie Ritchie    S/w Omaira Schrader. Scheduled f/u appt in next avail with REDDY Osullivan on 4/9/22 at 10:00AM.  Informed To that I will send this info to [facility] to see if she would like to see pt sooner this week/schedule with avail provider. Encouraged her to contact ofc of  and schedule f/u. To voices understanding and is agreeable.

## 2022-04-04 NOTE — TELEPHONE ENCOUNTER
Pt requesting UC follow up for Diverticulitis diagnosis. Pt states was told to follow up with Delicia Alcaraz today. Please advise if pt okay to wait until next available on 4/9 or if should be seen sooner this week?

## 2022-04-07 ENCOUNTER — OFFICE VISIT (OUTPATIENT)
Dept: FAMILY MEDICINE CLINIC | Facility: CLINIC | Age: 42
End: 2022-04-07
Payer: COMMERCIAL

## 2022-04-07 VITALS
TEMPERATURE: 98 F | RESPIRATION RATE: 16 BRPM | HEIGHT: 63 IN | BODY MASS INDEX: 32.75 KG/M2 | HEART RATE: 74 BPM | DIASTOLIC BLOOD PRESSURE: 76 MMHG | SYSTOLIC BLOOD PRESSURE: 128 MMHG | WEIGHT: 184.81 LBS

## 2022-04-07 DIAGNOSIS — Q79.0 BOCHDALEK HERNIA: ICD-10-CM

## 2022-04-07 DIAGNOSIS — K57.32 SIGMOID DIVERTICULITIS: Primary | ICD-10-CM

## 2022-04-07 PROCEDURE — 99214 OFFICE O/P EST MOD 30 MIN: CPT | Performed by: FAMILY MEDICINE

## 2022-04-07 PROCEDURE — 3008F BODY MASS INDEX DOCD: CPT | Performed by: FAMILY MEDICINE

## 2022-04-07 PROCEDURE — 3074F SYST BP LT 130 MM HG: CPT | Performed by: FAMILY MEDICINE

## 2022-04-07 PROCEDURE — 3078F DIAST BP <80 MM HG: CPT | Performed by: FAMILY MEDICINE

## 2022-04-07 RX ORDER — AMOXICILLIN AND CLAVULANATE POTASSIUM 875; 125 MG/1; MG/1
TABLET, FILM COATED ORAL
Qty: 14 TABLET | Refills: 0 | Status: SHIPPED | OUTPATIENT
Start: 2022-04-07

## 2022-07-26 PROBLEM — K57.32 DIVERTICULITIS, COLON: Status: ACTIVE | Noted: 2022-07-26

## 2022-07-26 PROBLEM — K63.5 POLYP OF COLON: Status: ACTIVE | Noted: 2022-07-26

## 2022-08-05 ENCOUNTER — MED REC SCAN ONLY (OUTPATIENT)
Dept: FAMILY MEDICINE CLINIC | Facility: CLINIC | Age: 42
End: 2022-08-05

## 2023-07-11 ENCOUNTER — PATIENT MESSAGE (OUTPATIENT)
Dept: FAMILY MEDICINE CLINIC | Facility: CLINIC | Age: 43
End: 2023-07-11

## 2023-07-12 NOTE — TELEPHONE ENCOUNTER
Not all dermatologists will evaluate lip lesions so she might want to check to see if they will evaluate the lip along with the bigger spot on the face. Dr. Sri Barnes (she may be months out on appointments but has other partners who can likely see sooner) for dermatology. ENT - Dr. Bijan Dye.

## 2023-08-14 ENCOUNTER — TELEPHONE (OUTPATIENT)
Dept: FAMILY MEDICINE CLINIC | Facility: CLINIC | Age: 43
End: 2023-08-14

## 2023-08-14 NOTE — TELEPHONE ENCOUNTER
Please call patient. Her mammogram from [address] [place] [address] (faxed to us) showed the question of a new 5 mm oval asymmetry inferiorly medially in the right breast.  Further evaluation was recommended with additional imaging along with a possible breast ultrasound. Reviewing other faxes - we did receive a fax for order - we will fax today. Please make sure patient is aware and schedules with them.

## 2023-08-14 NOTE — TELEPHONE ENCOUNTER
S/w To. Informed her that Zachary Bashir has received a copy of mammogram from [address] [place] [address] which showed the question of a new 5 mm oval asymmetry inferiorly medially in the right breast.  Further evaluation was recommended with additional imaging along with a possible breast ultrasound. We did receive a fax for order --we will fax today. She is aware and agreeable to schedule with them. No further questions at this time.

## 2023-08-15 ENCOUNTER — MED REC SCAN ONLY (OUTPATIENT)
Dept: FAMILY MEDICINE CLINIC | Facility: CLINIC | Age: 43
End: 2023-08-15

## 2023-09-07 ENCOUNTER — MED REC SCAN ONLY (OUTPATIENT)
Dept: FAMILY MEDICINE CLINIC | Facility: CLINIC | Age: 43
End: 2023-09-07

## 2023-10-30 ENCOUNTER — MED REC SCAN ONLY (OUTPATIENT)
Dept: FAMILY MEDICINE CLINIC | Facility: CLINIC | Age: 43
End: 2023-10-30

## 2024-01-18 NOTE — TELEPHONE ENCOUNTER
Cholesterol looks great. The high HDL (\"good cholesterol\") is what is making her total higher. Her breakdown looks good.
From: Maggie Radford  To: Mahnaz Ovalle PA-C  Sent: 2/24/2020 3:09 PM CST  Subject: Test Results Question    Hello! Sorry, I just remembered to ask how my cholesterol looked? I noticed the HDL was not sure if that was too high.  or if any of the othe
Him/He

## 2024-06-05 ENCOUNTER — APPOINTMENT (OUTPATIENT)
Dept: CT IMAGING | Age: 44
End: 2024-06-05
Attending: EMERGENCY MEDICINE
Payer: COMMERCIAL

## 2024-06-05 ENCOUNTER — HOSPITAL ENCOUNTER (OUTPATIENT)
Age: 44
Discharge: HOME | End: 2024-06-05
Attending: EMERGENCY MEDICINE
Payer: COMMERCIAL

## 2024-06-05 VITALS
TEMPERATURE: 97 F | BODY MASS INDEX: 34.37 KG/M2 | HEART RATE: 76 BPM | RESPIRATION RATE: 18 BRPM | OXYGEN SATURATION: 100 % | DIASTOLIC BLOOD PRESSURE: 89 MMHG | SYSTOLIC BLOOD PRESSURE: 138 MMHG | HEIGHT: 63 IN | WEIGHT: 194 LBS

## 2024-06-05 DIAGNOSIS — K57.92 ACUTE DIVERTICULITIS: Primary | ICD-10-CM

## 2024-06-05 LAB
#MXD IC: 0.4 X10ˆ3/UL (ref 0.1–1)
B-HCG UR QL: NEGATIVE
BILIRUB UR QL STRIP: NEGATIVE
BUN BLD-MCNC: 5 MG/DL (ref 7–18)
CHLORIDE BLD-SCNC: 103 MMOL/L (ref 98–112)
CLARITY UR: CLEAR
CO2 BLD-SCNC: 23 MMOL/L (ref 21–32)
COLOR UR: YELLOW
CREAT BLD-MCNC: 0.7 MG/DL
EGFRCR SERPLBLD CKD-EPI 2021: 109 ML/MIN/1.73M2 (ref 60–?)
GLUCOSE BLD-MCNC: 106 MG/DL (ref 70–99)
GLUCOSE UR STRIP-MCNC: NEGATIVE MG/DL
HCT VFR BLD AUTO: 36.1 %
HCT VFR BLD CALC: 37 %
HGB BLD-MCNC: 11.9 G/DL
HGB UR QL STRIP: NEGATIVE
ISTAT IONIZED CALCIUM FOR CHEM 8: 1.2 MMOL/L (ref 1.12–1.32)
KETONES UR STRIP-MCNC: NEGATIVE MG/DL
LEUKOCYTE ESTERASE UR QL STRIP: NEGATIVE
LYMPHOCYTES # BLD AUTO: 1.2 X10ˆ3/UL (ref 1–4)
LYMPHOCYTES NFR BLD AUTO: 23 %
MCH RBC QN AUTO: 30.1 PG (ref 26–34)
MCHC RBC AUTO-ENTMCNC: 33 G/DL (ref 31–37)
MCV RBC AUTO: 91.4 FL (ref 80–100)
MIXED CELL %: 7.8 %
NEUTROPHILS # BLD AUTO: 3.8 X10ˆ3/UL (ref 1.5–7.7)
NEUTROPHILS NFR BLD AUTO: 69.2 %
NITRITE UR QL STRIP: NEGATIVE
PH UR STRIP: 7 [PH]
PLATELET # BLD AUTO: 291 X10ˆ3/UL (ref 150–450)
POTASSIUM BLD-SCNC: 3.9 MMOL/L (ref 3.6–5.1)
PROT UR STRIP-MCNC: NEGATIVE MG/DL
RBC # BLD AUTO: 3.95 X10ˆ6/UL
SODIUM BLD-SCNC: 136 MMOL/L (ref 136–145)
SP GR UR STRIP: 1.01
UROBILINOGEN UR STRIP-ACNC: <2 MG/DL
WBC # BLD AUTO: 5.4 X10ˆ3/UL (ref 4–11)

## 2024-06-05 PROCEDURE — 80047 BASIC METABLC PNL IONIZED CA: CPT

## 2024-06-05 PROCEDURE — 81025 URINE PREGNANCY TEST: CPT

## 2024-06-05 PROCEDURE — 96374 THER/PROPH/DIAG INJ IV PUSH: CPT

## 2024-06-05 PROCEDURE — 74177 CT ABD & PELVIS W/CONTRAST: CPT | Performed by: EMERGENCY MEDICINE

## 2024-06-05 PROCEDURE — 99214 OFFICE O/P EST MOD 30 MIN: CPT

## 2024-06-05 PROCEDURE — 81002 URINALYSIS NONAUTO W/O SCOPE: CPT

## 2024-06-05 PROCEDURE — 85025 COMPLETE CBC W/AUTO DIFF WBC: CPT | Performed by: EMERGENCY MEDICINE

## 2024-06-05 PROCEDURE — 99215 OFFICE O/P EST HI 40 MIN: CPT

## 2024-06-05 RX ORDER — KETOROLAC TROMETHAMINE 30 MG/ML
30 INJECTION, SOLUTION INTRAMUSCULAR; INTRAVENOUS ONCE
Status: COMPLETED | OUTPATIENT
Start: 2024-06-05 | End: 2024-06-05

## 2024-06-05 RX ORDER — AMOXICILLIN AND CLAVULANATE POTASSIUM 875; 125 MG/1; MG/1
1 TABLET, FILM COATED ORAL 2 TIMES DAILY
Qty: 20 TABLET | Refills: 0 | Status: SHIPPED | OUTPATIENT
Start: 2024-06-05 | End: 2024-06-15

## 2024-06-05 NOTE — ED PROVIDER NOTES
Patient Seen in: [facility]      History     Chief Complaint   Patient presents with    Abdominal Pain     Stated Complaint: lower abdominal pain    Subjective:   HPI    44-year-old female complaint of left lower quadrant abdominal pain that started a couple days ago been fairly steady but does fluctuate intensity does not radiate anywhere she had slight decreased appetite but no vomiting no constipation diarrhea no bloody stools no hematuria or dysuria.  She has had diverticulitis in the past that seems similar.    Objective:   Past Medical History:    Abdominal pain    Anemia    Anorexia nervosa (HCC)    Anxiety    Bloating    Body piercing    Decorative tattoo    Depressive disorder, not elsewhere classified    Fatigue    Flatulence/gas pain/belching    Food intolerance    Heavy menses    History of depression    History of eating disorder    History of mental disorder    Irregular bowel habits    Leg swelling    Menses painful    Night sweats    Pain in joints    Personal history of neurosis    Skin blushing/flushing    Sleep disturbance    Stress    Unspecified hemorrhoids without mention of complication    Weight gain              History reviewed. No pertinent surgical history.             Social History     Socioeconomic History    Marital status:    Tobacco Use    Smoking status: Former     Current packs/day: 0.00     Types: Cigarettes    Smokeless tobacco: Never    Tobacco comments:     casual in college   Vaping Use    Vaping status: Never Used   Substance and Sexual Activity    Alcohol use: Yes     Alcohol/week: 4.0 standard drinks of alcohol     Types: 1 Glasses of wine, 3 Cans of beer per week     Comment: social    Drug use: No   Other Topics Concern    Caffeine Concern No     Comment: 2 cups coffee daily    Exercise Yes     Comment: 1-2x week              Review of Systems    Positive for stated complaint: lower abdominal pain  Other systems are as noted in  HPI.  Constitutional and vital signs reviewed.      All other systems reviewed and negative except as noted above.    Physical Exam     ED Triage Vitals [06/05/24 1125]   /89   Pulse 76   Resp 18   Temp 97.1 °F (36.2 °C)   Temp src Temporal   SpO2 100 %   O2 Device None (Room air)       Current Vitals:   Vital Signs  BP: 138/89  Pulse: 76  Resp: 18  Temp: 97.1 °F (36.2 °C)  Temp src: Temporal    Oxygen Therapy  SpO2: 100 %  O2 Device: None (Room air)            Physical Exam    Alert and oriented ×3 in no acute distress.  HEENT exam within normal limits.  Neck supple without lymphadenopathy or JVD.  Lungs are clear to auscultation.  Cardiovascular exam regular rate and rhythm without murmurs.  Abdomen is soft and moderate left lower quadrant tenderness without rebound or guarding without rebound or guarding.  Extremities no clubbing cyanosis or edema.  Skin there is no rash.  Neurologic exam is within normal limits no focal deficits.    ED Course     Labs Reviewed   POCT CBC - Abnormal; Notable for the following components:       Result Value    HGB IC 11.9 (*)     All other components within normal limits   POCT ISTAT CHEM8 CARTRIDGE - Abnormal; Notable for the following components:    ISTAT BUN 5 (*)     ISTAT Glucose 106 (*)     All other components within normal limits   POCT PREGNANCY URINE - Normal   [facility] POCT URINALYSIS DIPSTICK             CT ABDOMEN+PELVIS(CONTRAST ONLY)(CPT=74177)    Result Date: 6/5/2024  CONCLUSION:  Imaging findings are consistent with acute diverticulitis at the posterior distal aspect of the descending colon.  No drainable fluid collection is identified.   LOCATION:  [facility]   Dictated by (CST): Stromberg, LeRoy, MD on 6/05/2024 at 12:47 PM     Finalized by (CST): Stromberg, LeRoy, MD on 6/05/2024 at 12:59 PM      Images independently reviewed there is diverticulitis.    Labs unremarkable.         MDM      Initial differential diagnosis considered but not limited to includes  diverticulitis renal colic.    Patient has diverticulitis no complicating factors was given Augmentin had issues taking Flagyl in the past.  Vies her to follow-up with her doctor next few days return if worse.                               Medical Decision Making      Disposition and Plan     Clinical Impression:  1. Acute diverticulitis         Disposition:  Discharge  6/5/2024  1:06 pm    Follow-up:  Evan Lopes MD  [address] [place] [id number] [phone]    In 2 days            Medications Prescribed:  Current Discharge Medication List        START taking these medications    Details   amoxicillin clavulanate 875-125 MG Oral Tab Take 1 tablet by mouth 2 (two) times daily for 10 days.  Qty: 20 tablet, Refills: 0

## 2024-06-05 NOTE — DISCHARGE INSTRUCTIONS
Liquid diet for 2 days then advance as tolerated.  Tylenol or Advil for pain take over-the-counter stool softeners as needed.  Take the antibiotics as prescribed recheck with your doctor in 2 to 3 days return for worsening symptoms or go to the ER

## 2025-03-23 ENCOUNTER — HOSPITAL ENCOUNTER (OUTPATIENT)
Age: 45
Discharge: HOME | End: 2025-03-23
Payer: COMMERCIAL

## 2025-03-23 VITALS
RESPIRATION RATE: 20 BRPM | WEIGHT: 190 LBS | BODY MASS INDEX: 33.66 KG/M2 | HEART RATE: 77 BPM | TEMPERATURE: 99 F | OXYGEN SATURATION: 98 % | DIASTOLIC BLOOD PRESSURE: 88 MMHG | SYSTOLIC BLOOD PRESSURE: 140 MMHG | HEIGHT: 63 IN

## 2025-03-23 DIAGNOSIS — H69.92 EUSTACHIAN TUBE DYSFUNCTION, LEFT: Primary | ICD-10-CM

## 2025-03-23 PROCEDURE — 99213 OFFICE O/P EST LOW 20 MIN: CPT

## 2025-03-23 RX ORDER — METHYLPREDNISOLONE 4 MG/1
TABLET ORAL
Qty: 21 TABLET | Refills: 0 | Status: SHIPPED | OUTPATIENT
Start: 2025-03-23

## 2025-03-23 NOTE — DISCHARGE INSTRUCTIONS
Go directly to emergency department for severe ear pain, sudden inability to hearing, swelling to your face behind ears or in your neck  You may also take ibuprofen or Tylenol for pain

## 2025-03-23 NOTE — ED INITIAL ASSESSMENT (HPI)
Pt states since Friday noticed lt ear pressure and muffled hearing.   Denies pain to ear.    Has been congested.
